# Patient Record
Sex: MALE | Race: WHITE | HISPANIC OR LATINO | Employment: PART TIME | URBAN - METROPOLITAN AREA
[De-identification: names, ages, dates, MRNs, and addresses within clinical notes are randomized per-mention and may not be internally consistent; named-entity substitution may affect disease eponyms.]

---

## 2017-01-02 ENCOUNTER — HOSPITAL ENCOUNTER (OUTPATIENT)
Dept: GENERAL RADIOLOGY | Facility: HOSPITAL | Age: 27
Discharge: HOME | End: 2017-01-02
Admitting: FAMILY MEDICINE

## 2017-01-02 DIAGNOSIS — R76.11 PPD POSITIVE: ICD-10-CM

## 2017-01-02 PROCEDURE — 71020 HC CHEST PA AND LATERAL: CPT

## 2024-09-17 ENCOUNTER — OFFICE VISIT (OUTPATIENT)
Dept: FAMILY MEDICINE CLINIC | Facility: CLINIC | Age: 34
End: 2024-09-17
Payer: COMMERCIAL

## 2024-09-17 VITALS
DIASTOLIC BLOOD PRESSURE: 72 MMHG | HEART RATE: 78 BPM | OXYGEN SATURATION: 98 % | WEIGHT: 20 LBS | TEMPERATURE: 98.6 F | SYSTOLIC BLOOD PRESSURE: 106 MMHG

## 2024-09-17 DIAGNOSIS — R19.7 DIARRHEA, UNSPECIFIED TYPE: ICD-10-CM

## 2024-09-17 DIAGNOSIS — R10.32 LEFT LOWER QUADRANT PAIN: Primary | ICD-10-CM

## 2024-09-17 PROCEDURE — 99204 OFFICE O/P NEW MOD 45 MIN: CPT | Performed by: NURSE PRACTITIONER

## 2024-09-18 LAB
ALBUMIN SERPL-MCNC: 4.5 G/DL (ref 4.1–5.1)
ALP SERPL-CCNC: 111 IU/L (ref 44–121)
ALT SERPL-CCNC: 46 IU/L (ref 0–44)
AMYLASE SERPL-CCNC: 44 U/L (ref 31–110)
AST SERPL-CCNC: 20 IU/L (ref 0–40)
BASOPHILS # BLD AUTO: 0.1 X10E3/UL (ref 0–0.2)
BASOPHILS NFR BLD AUTO: 1 %
BILIRUB SERPL-MCNC: 0.2 MG/DL (ref 0–1.2)
BUN SERPL-MCNC: 12 MG/DL (ref 6–20)
BUN/CREAT SERPL: 15 (ref 9–20)
CALCIUM SERPL-MCNC: 9.8 MG/DL (ref 8.7–10.2)
CHLORIDE SERPL-SCNC: 97 MMOL/L (ref 96–106)
CHOLEST SERPL-MCNC: 182 MG/DL (ref 100–199)
CO2 SERPL-SCNC: 22 MMOL/L (ref 20–29)
CREAT SERPL-MCNC: 0.79 MG/DL (ref 0.76–1.27)
EGFRCR SERPLBLD CKD-EPI 2021: 118 ML/MIN/1.73
EOSINOPHIL # BLD AUTO: 0.3 X10E3/UL (ref 0–0.4)
EOSINOPHIL NFR BLD AUTO: 2 %
ERYTHROCYTE [DISTWIDTH] IN BLOOD BY AUTOMATED COUNT: 12.1 % (ref 11.6–15.4)
GLOBULIN SER CALC-MCNC: 3.2 G/DL (ref 1.5–4.5)
GLUCOSE SERPL-MCNC: 60 MG/DL (ref 70–99)
HCT VFR BLD AUTO: 44 % (ref 37.5–51)
HDLC SERPL-MCNC: 29 MG/DL
HGB BLD-MCNC: 14.4 G/DL (ref 13–17.7)
IMM GRANULOCYTES # BLD AUTO: 0.1 X10E3/UL (ref 0–0.1)
IMM GRANULOCYTES NFR BLD AUTO: 1 %
LDLC SERPL CALC-MCNC: 123 MG/DL (ref 0–99)
LIPASE SERPL-CCNC: 23 U/L (ref 13–78)
LYMPHOCYTES # BLD AUTO: 2.9 X10E3/UL (ref 0.7–3.1)
LYMPHOCYTES NFR BLD AUTO: 28 %
MCH RBC QN AUTO: 29 PG (ref 26.6–33)
MCHC RBC AUTO-ENTMCNC: 32.7 G/DL (ref 31.5–35.7)
MCV RBC AUTO: 89 FL (ref 79–97)
MONOCYTES # BLD AUTO: 1 X10E3/UL (ref 0.1–0.9)
MONOCYTES NFR BLD AUTO: 9 %
NEUTROPHILS # BLD AUTO: 6.3 X10E3/UL (ref 1.4–7)
NEUTROPHILS NFR BLD AUTO: 59 %
PLATELET # BLD AUTO: 574 X10E3/UL (ref 150–450)
POTASSIUM SERPL-SCNC: 4.9 MMOL/L (ref 3.5–5.2)
PROT SERPL-MCNC: 7.7 G/DL (ref 6–8.5)
RBC # BLD AUTO: 4.96 X10E6/UL (ref 4.14–5.8)
SODIUM SERPL-SCNC: 138 MMOL/L (ref 134–144)
T4 FREE SERPL-MCNC: 1.12 NG/DL (ref 0.82–1.77)
TRIGL SERPL-MCNC: 165 MG/DL (ref 0–149)
TSH SERPL DL<=0.005 MIU/L-ACNC: 1.26 UIU/ML (ref 0.45–4.5)
VLDLC SERPL CALC-MCNC: 30 MG/DL (ref 5–40)
WBC # BLD AUTO: 10.6 X10E3/UL (ref 3.4–10.8)

## 2024-09-19 DIAGNOSIS — R19.7 DIARRHEA, UNSPECIFIED TYPE: Primary | ICD-10-CM

## 2024-09-19 DIAGNOSIS — R10.32 LEFT LOWER QUADRANT PAIN: ICD-10-CM

## 2024-09-19 DIAGNOSIS — R79.89 ELEVATED PLATELET COUNT: Primary | ICD-10-CM

## 2024-09-20 ENCOUNTER — PATIENT ROUNDING (BHMG ONLY) (OUTPATIENT)
Dept: FAMILY MEDICINE CLINIC | Facility: CLINIC | Age: 34
End: 2024-09-20
Payer: COMMERCIAL

## 2024-09-24 ENCOUNTER — HOSPITAL ENCOUNTER (OUTPATIENT)
Dept: CT IMAGING | Facility: HOSPITAL | Age: 34
Discharge: HOME | End: 2024-09-24
Admitting: NURSE PRACTITIONER
Payer: COMMERCIAL

## 2024-09-24 DIAGNOSIS — R10.32 LEFT LOWER QUADRANT PAIN: ICD-10-CM

## 2024-09-24 DIAGNOSIS — R19.7 DIARRHEA, UNSPECIFIED TYPE: ICD-10-CM

## 2024-09-24 PROCEDURE — 74177 CT ABD & PELVIS W/CONTRAST: CPT

## 2024-09-24 PROCEDURE — 25510000001 IOPAMIDOL 61 % SOLUTION: Performed by: NURSE PRACTITIONER

## 2024-09-24 PROCEDURE — 0 DIATRIZOATE MEGLUMINE & SODIUM PER 1 ML: Performed by: NURSE PRACTITIONER

## 2024-09-24 RX ORDER — IOPAMIDOL 612 MG/ML
100 INJECTION, SOLUTION INTRAVASCULAR
Status: COMPLETED | OUTPATIENT
Start: 2024-09-24 | End: 2024-09-24

## 2024-09-24 RX ORDER — DIATRIZOATE MEGLUMINE AND DIATRIZOATE SODIUM 660; 100 MG/ML; MG/ML
30 SOLUTION ORAL; RECTAL
Status: COMPLETED | OUTPATIENT
Start: 2024-09-24 | End: 2024-09-24

## 2024-09-24 RX ADMIN — IOPAMIDOL 85 ML: 612 INJECTION, SOLUTION INTRAVENOUS at 17:18

## 2024-09-24 RX ADMIN — DIATRIZOATE MEGLUMINE AND DIATRIZOATE SODIUM 30 ML: 660; 100 LIQUID ORAL; RECTAL at 17:18

## 2024-09-26 DIAGNOSIS — K38.9 DISORDER OF APPENDIX: ICD-10-CM

## 2024-09-26 DIAGNOSIS — R93.5 ABNORMAL ABDOMINAL CT SCAN: Primary | ICD-10-CM

## 2024-10-03 ENCOUNTER — OFFICE VISIT (OUTPATIENT)
Dept: SURGERY | Facility: CLINIC | Age: 34
End: 2024-10-03
Payer: COMMERCIAL

## 2024-10-03 ENCOUNTER — HOSPITAL ENCOUNTER (OUTPATIENT)
Dept: CT IMAGING | Facility: HOSPITAL | Age: 34
Discharge: HOME | End: 2024-10-03
Admitting: SURGERY
Payer: COMMERCIAL

## 2024-10-03 ENCOUNTER — PREP FOR SURGERY (OUTPATIENT)
Dept: OTHER | Facility: HOSPITAL | Age: 34
End: 2024-10-03
Payer: COMMERCIAL

## 2024-10-03 VITALS
WEIGHT: 205.6 LBS | SYSTOLIC BLOOD PRESSURE: 128 MMHG | HEIGHT: 76 IN | DIASTOLIC BLOOD PRESSURE: 90 MMHG | BODY MASS INDEX: 25.04 KG/M2

## 2024-10-03 DIAGNOSIS — R10.31 RLQ ABDOMINAL PAIN: ICD-10-CM

## 2024-10-03 DIAGNOSIS — R93.5 ABNORMAL CT OF THE ABDOMEN: Primary | ICD-10-CM

## 2024-10-03 DIAGNOSIS — R93.5 ABNORMAL CT OF THE ABDOMEN: ICD-10-CM

## 2024-10-03 DIAGNOSIS — K38.9 DISORDER OF APPENDIX: ICD-10-CM

## 2024-10-03 LAB — CREAT BLDA-MCNC: 1 MG/DL (ref 0.6–1.3)

## 2024-10-03 PROCEDURE — 99204 OFFICE O/P NEW MOD 45 MIN: CPT | Performed by: PHYSICIAN ASSISTANT

## 2024-10-03 PROCEDURE — 74177 CT ABD & PELVIS W/CONTRAST: CPT

## 2024-10-03 PROCEDURE — 25510000001 IOPAMIDOL 61 % SOLUTION: Performed by: SURGERY

## 2024-10-03 PROCEDURE — 82565 ASSAY OF CREATININE: CPT

## 2024-10-03 RX ORDER — AMOXICILLIN 500 MG/1
500 CAPSULE ORAL 3 TIMES DAILY
COMMUNITY
End: 2024-10-04 | Stop reason: HOSPADM

## 2024-10-03 RX ORDER — IOPAMIDOL 612 MG/ML
100 INJECTION, SOLUTION INTRAVASCULAR
Status: COMPLETED | OUTPATIENT
Start: 2024-10-03 | End: 2024-10-03

## 2024-10-03 RX ADMIN — IOPAMIDOL 85 ML: 612 INJECTION, SOLUTION INTRAVENOUS at 11:55

## 2024-10-03 NOTE — PROGRESS NOTES
ASSESSMENT/PLAN:    34-year-old gentleman with an abnormal CT concerning for acute appendicitis but with near resolution of his right lower quadrant abdominal pain since being on antibiotics.  Given the extent of the inflammation on his previous CT we repeated his CT as stat and this again demonstrated findings most consistent with acute appendicitis.  As such Dr. Wilhelm and I have recommended proceeding with a laparoscopic appendectomy.  Risks and rationale were discussed with him with risks including but not limited to bleeding, infection, converting to an open procedure and the expected postoperative course.  All questions were answered and he was willing to proceed with all recommendations.    CC:     Abnormal CT    HPI:    34-year-old gentleman presenting to the office today as a self-referral.  Over the last 3 weeks he has complained of lower abdominal pain that has been intermittent but culminated in him being awoken at night having significant right lower quadrant abdominal pain as well as significant sweating concerning for a fever.  He denies having had nausea or vomiting during this nor has he had abdominal pain elsewhere, changes to his bowel function or any additional complaints.  He was set up with a primary care doctor and was placed on Augmentin which improved his symptoms.  He was also sent in for a stat CT scan which demonstrated right lower quadrant ill-defined tissue attenuation nodular density located along the course of the appendix and adjacent terminal ileum.  With this finding he was recommended to come to our office to discuss options.    ENDOSCOPY:   Colonoscopy: None    RADIOLOGY:   CT abdomen pelvis 9/24/2024: Right lower quadrant ill-defined soft tissue attenuation nodular density located along the course of the appendix and adjacent terminal ileum  CT abdomen pelvis 10/3/2024: Right lower quadrant inflammation extending caudal to the cecum and anterior to the distal right psoas muscle  "appears centered around the appendix with enlargement of the appendiceal tip    SOCIAL HISTORY:   Every day tobacco use  Occasional alcohol use    FAMILY HISTORY:    Colorectal cancer: None    PREVIOUS ABDOMINAL SURGERY    None    OTHER SURGERY  History reviewed. No pertinent surgical history.    PAST MEDICAL HISTORY:    History reviewed. No pertinent past medical history.    MEDICATIONS:     Current Outpatient Medications:     amoxicillin (AMOXIL) 500 MG capsule, Take 1 capsule by mouth 3 (Three) Times a Day., Disp: , Rfl:     meloxicam (MOBIC) 15 MG tablet, Take 1 tablet by mouth Daily., Disp: 30 tablet, Rfl: 5  No current facility-administered medications for this visit.    ALLERGIES:   No Known Allergies    PHYSICAL EXAM:   Constitutional: Well-developed well-nourished, no acute distress  Vital signs:   Height 76\"  Weight 215  BMI 25  Neck: Supple, no palpable mass, trachea midline  Respiratory: Clear to auscultation, normal inspiratory effort  Cardiovascular: Regular rate, no murmur, no carotid bruit  Gastrointestinal: Nontender right lower quadrant, remainder of abdomen is soft, nontender  Psychiatric: Alert and oriented ×3, normal affect          Kilo Torres PA-C    [facility] - General Surgery  [address], [address]  [place] [address]    [address] [facility] [address] [facility], [address]  [place] [address]    Office: [phone]  Fax: [phone]     "

## 2024-10-03 NOTE — NURSING NOTE
1200 Patient arrived in radiology triage for stat hold and call.    1242 Dr. Wilhelm called patient and told them they could leave. I removed IV and directed them to Entrance A to exit.

## 2024-10-03 NOTE — H&P (VIEW-ONLY)
ASSESSMENT/PLAN:    34-year-old gentleman with an abnormal CT concerning for acute appendicitis but with near resolution of his right lower quadrant abdominal pain since being on antibiotics.  Given the extent of the inflammation on his previous CT we repeated his CT as stat and this again demonstrated findings most consistent with acute appendicitis.  As such Dr. Wilhelm and I have recommended proceeding with a laparoscopic appendectomy.  Risks and rationale were discussed with him with risks including but not limited to bleeding, infection, converting to an open procedure and the expected postoperative course.  All questions were answered and he was willing to proceed with all recommendations.    CC:     Abnormal CT    HPI:    34-year-old gentleman presenting to the office today as a self-referral.  Over the last 3 weeks he has complained of lower abdominal pain that has been intermittent but culminated in him being awoken at night having significant right lower quadrant abdominal pain as well as significant sweating concerning for a fever.  He denies having had nausea or vomiting during this nor has he had abdominal pain elsewhere, changes to his bowel function or any additional complaints.  He was set up with a primary care doctor and was placed on Augmentin which improved his symptoms.  He was also sent in for a stat CT scan which demonstrated right lower quadrant ill-defined tissue attenuation nodular density located along the course of the appendix and adjacent terminal ileum.  With this finding he was recommended to come to our office to discuss options.    ENDOSCOPY:   Colonoscopy: None    RADIOLOGY:   CT abdomen pelvis 9/24/2024: Right lower quadrant ill-defined soft tissue attenuation nodular density located along the course of the appendix and adjacent terminal ileum  CT abdomen pelvis 10/3/2024: Right lower quadrant inflammation extending caudal to the cecum and anterior to the distal right psoas muscle  "appears centered around the appendix with enlargement of the appendiceal tip    SOCIAL HISTORY:   Every day tobacco use  Occasional alcohol use    FAMILY HISTORY:    Colorectal cancer: None    PREVIOUS ABDOMINAL SURGERY    None    OTHER SURGERY  History reviewed. No pertinent surgical history.    PAST MEDICAL HISTORY:    History reviewed. No pertinent past medical history.    MEDICATIONS:     Current Outpatient Medications:     amoxicillin (AMOXIL) 500 MG capsule, Take 1 capsule by mouth 3 (Three) Times a Day., Disp: , Rfl:     meloxicam (MOBIC) 15 MG tablet, Take 1 tablet by mouth Daily., Disp: 30 tablet, Rfl: 5  No current facility-administered medications for this visit.    ALLERGIES:   No Known Allergies    PHYSICAL EXAM:   Constitutional: Well-developed well-nourished, no acute distress  Vital signs:   Height 76\"  Weight 215  BMI 25  Neck: Supple, no palpable mass, trachea midline  Respiratory: Clear to auscultation, normal inspiratory effort  Cardiovascular: Regular rate, no murmur, no carotid bruit  Gastrointestinal: Nontender right lower quadrant, remainder of abdomen is soft, nontender  Psychiatric: Alert and oriented ×3, normal affect          Kilo Torres PA-C    [facility] - General Surgery  [address], [address]  [place] [address]    [address] [facility] [facility], [address]  [place] [address]    Office: [phone]  Fax: [phone]     "

## 2024-10-04 ENCOUNTER — ANESTHESIA (OUTPATIENT)
Dept: PERIOP | Facility: HOSPITAL | Age: 34
End: 2024-10-04
Payer: COMMERCIAL

## 2024-10-04 ENCOUNTER — ANESTHESIA EVENT (OUTPATIENT)
Dept: PERIOP | Facility: HOSPITAL | Age: 34
End: 2024-10-04
Payer: COMMERCIAL

## 2024-10-04 ENCOUNTER — HOSPITAL ENCOUNTER (OUTPATIENT)
Facility: HOSPITAL | Age: 34
Setting detail: HOSPITAL OUTPATIENT SURGERY
Discharge: HOME | End: 2024-10-04
Attending: SURGERY | Admitting: SURGERY
Payer: COMMERCIAL

## 2024-10-04 VITALS
RESPIRATION RATE: 14 BRPM | DIASTOLIC BLOOD PRESSURE: 93 MMHG | HEART RATE: 60 BPM | TEMPERATURE: 97.5 F | SYSTOLIC BLOOD PRESSURE: 135 MMHG | OXYGEN SATURATION: 99 %

## 2024-10-04 DIAGNOSIS — R10.31 RLQ ABDOMINAL PAIN: ICD-10-CM

## 2024-10-04 DIAGNOSIS — K38.9 DISORDER OF APPENDIX: ICD-10-CM

## 2024-10-04 DIAGNOSIS — R93.5 ABNORMAL CT OF THE ABDOMEN: ICD-10-CM

## 2024-10-04 PROCEDURE — 25010000002 SUGAMMADEX 200 MG/2ML SOLUTION: Performed by: ANESTHESIOLOGY

## 2024-10-04 PROCEDURE — 25810000003 LACTATED RINGERS PER 1000 ML: Performed by: ANESTHESIOLOGY

## 2024-10-04 PROCEDURE — 25810000003 SODIUM CHLORIDE PER 500 ML: Performed by: SURGERY

## 2024-10-04 PROCEDURE — 44970 LAPAROSCOPY APPENDECTOMY: CPT | Performed by: SURGERY

## 2024-10-04 PROCEDURE — 88304 TISSUE EXAM BY PATHOLOGIST: CPT | Performed by: SURGERY

## 2024-10-04 PROCEDURE — 44970 LAPAROSCOPY APPENDECTOMY: CPT | Performed by: PHYSICIAN ASSISTANT

## 2024-10-04 PROCEDURE — 25010000002 ONDANSETRON PER 1 MG: Performed by: ANESTHESIOLOGY

## 2024-10-04 PROCEDURE — 25010000002 HYDROMORPHONE 1 MG/ML SOLUTION: Performed by: ANESTHESIOLOGY

## 2024-10-04 PROCEDURE — 25010000002 CEFAZOLIN PER 500 MG: Performed by: SURGERY

## 2024-10-04 PROCEDURE — 25010000002 MIDAZOLAM PER 1 MG: Performed by: ANESTHESIOLOGY

## 2024-10-04 PROCEDURE — 25010000002 PROPOFOL 10 MG/ML EMULSION: Performed by: ANESTHESIOLOGY

## 2024-10-04 PROCEDURE — 25010000002 LIDOCAINE 2% SOLUTION: Performed by: ANESTHESIOLOGY

## 2024-10-04 PROCEDURE — 25010000002 DEXAMETHASONE PER 1 MG: Performed by: ANESTHESIOLOGY

## 2024-10-04 PROCEDURE — 88312 SPECIAL STAINS GROUP 1: CPT | Performed by: SURGERY

## 2024-10-04 PROCEDURE — 25010000002 KETOROLAC TROMETHAMINE PER 15 MG: Performed by: ANESTHESIOLOGY

## 2024-10-04 DEVICE — HORIZON TI ML 6 CLIPS/CART
Type: IMPLANTABLE DEVICE | Site: ABDOMEN | Status: FUNCTIONAL
Brand: WECK

## 2024-10-04 DEVICE — THE ECHELON, ECHELON ENDOPATH™ AND ECHELON FLEX™ FAMILIES OF ENDOSCOPIC LINEAR CUTTERS AND RELOADS ARE STERILE, SINGLE PATIENT USE INSTRUMENTS THAT SIMULTANEOUSLY CUT AND STAPLE TISSUE. THERE ARE SIX STAGGERED ROWS OF STAPLES, THREE ON EITHER SIDE OF THE CUT LINE. THE 45 MM INSTRUMENTS HAVE A STAPLE LINE THATIS APPROXIMATELY 45 MM LONG AND A CUT LINE THAT IS APPROXIMATELY 42 MM LONG. THE SHAFT CAN ROTATE FREELY IN BOTH DIRECTIONS AND AN ARTICULATION MECHANISM ON ARTICULATING INSTRUMENTS ENABLES BENDING THE DISTAL PORTIONOF THE SHAFT TO FACILITATE LATERAL ACCESS OF THE OPERATIVE SITE.THE INSTRUMENTS ARE SHIPPED WITHOUT A RELOAD AND MUST BE LOADED PRIOR TO USE. A STAPLE RETAINING CAP ON THE RELOAD PROTECTS THE STAPLE LEG POINTS DURING SHIPPING AND TRANSPORTATION. THE INSTRUMENTS’ LOCK-OUT FEATURE IS DESIGNED TO PREVENT A USED RELOAD FROM BEING REFIRED.
Type: IMPLANTABLE DEVICE | Site: CECUM | Status: FUNCTIONAL
Brand: ECHELON ENDOPATH

## 2024-10-04 RX ORDER — FENTANYL CITRATE 50 UG/ML
50 INJECTION, SOLUTION INTRAMUSCULAR; INTRAVENOUS
Status: DISCONTINUED | OUTPATIENT
Start: 2024-10-04 | End: 2024-10-04 | Stop reason: HOSPADM

## 2024-10-04 RX ORDER — EPHEDRINE SULFATE 50 MG/ML
5 INJECTION, SOLUTION INTRAVENOUS ONCE AS NEEDED
Status: DISCONTINUED | OUTPATIENT
Start: 2024-10-04 | End: 2024-10-04 | Stop reason: HOSPADM

## 2024-10-04 RX ORDER — DROPERIDOL 2.5 MG/ML
0.62 INJECTION, SOLUTION INTRAMUSCULAR; INTRAVENOUS
Status: DISCONTINUED | OUTPATIENT
Start: 2024-10-04 | End: 2024-10-04 | Stop reason: HOSPADM

## 2024-10-04 RX ORDER — SODIUM CHLORIDE, SODIUM LACTATE, POTASSIUM CHLORIDE, CALCIUM CHLORIDE 600; 310; 30; 20 MG/100ML; MG/100ML; MG/100ML; MG/100ML
9 INJECTION, SOLUTION INTRAVENOUS CONTINUOUS
Status: DISCONTINUED | OUTPATIENT
Start: 2024-10-04 | End: 2024-10-04 | Stop reason: HOSPADM

## 2024-10-04 RX ORDER — DEXAMETHASONE SODIUM PHOSPHATE 4 MG/ML
INJECTION, SOLUTION INTRA-ARTICULAR; INTRALESIONAL; INTRAMUSCULAR; INTRAVENOUS; SOFT TISSUE AS NEEDED
Status: DISCONTINUED | OUTPATIENT
Start: 2024-10-04 | End: 2024-10-04 | Stop reason: SURG

## 2024-10-04 RX ORDER — OXYCODONE AND ACETAMINOPHEN 7.5; 325 MG/1; MG/1
1 TABLET ORAL EVERY 4 HOURS PRN
Status: DISCONTINUED | OUTPATIENT
Start: 2024-10-04 | End: 2024-10-04 | Stop reason: HOSPADM

## 2024-10-04 RX ORDER — ONDANSETRON 2 MG/ML
INJECTION INTRAMUSCULAR; INTRAVENOUS AS NEEDED
Status: DISCONTINUED | OUTPATIENT
Start: 2024-10-04 | End: 2024-10-04 | Stop reason: SURG

## 2024-10-04 RX ORDER — DIPHENHYDRAMINE HYDROCHLORIDE 50 MG/ML
12.5 INJECTION INTRAMUSCULAR; INTRAVENOUS
Status: DISCONTINUED | OUTPATIENT
Start: 2024-10-04 | End: 2024-10-04 | Stop reason: HOSPADM

## 2024-10-04 RX ORDER — NALOXONE HCL 0.4 MG/ML
0.2 VIAL (ML) INJECTION AS NEEDED
Status: DISCONTINUED | OUTPATIENT
Start: 2024-10-04 | End: 2024-10-04 | Stop reason: HOSPADM

## 2024-10-04 RX ORDER — PROMETHAZINE HYDROCHLORIDE 25 MG/1
25 TABLET ORAL ONCE AS NEEDED
Status: DISCONTINUED | OUTPATIENT
Start: 2024-10-04 | End: 2024-10-04 | Stop reason: HOSPADM

## 2024-10-04 RX ORDER — LIDOCAINE HYDROCHLORIDE 20 MG/ML
INJECTION, SOLUTION INFILTRATION; PERINEURAL AS NEEDED
Status: DISCONTINUED | OUTPATIENT
Start: 2024-10-04 | End: 2024-10-04 | Stop reason: SURG

## 2024-10-04 RX ORDER — IPRATROPIUM BROMIDE AND ALBUTEROL SULFATE 2.5; .5 MG/3ML; MG/3ML
3 SOLUTION RESPIRATORY (INHALATION) ONCE AS NEEDED
Status: DISCONTINUED | OUTPATIENT
Start: 2024-10-04 | End: 2024-10-04 | Stop reason: HOSPADM

## 2024-10-04 RX ORDER — ROCURONIUM BROMIDE 10 MG/ML
INJECTION, SOLUTION INTRAVENOUS AS NEEDED
Status: DISCONTINUED | OUTPATIENT
Start: 2024-10-04 | End: 2024-10-04 | Stop reason: SURG

## 2024-10-04 RX ORDER — SODIUM CHLORIDE 0.9 % (FLUSH) 0.9 %
3 SYRINGE (ML) INJECTION EVERY 12 HOURS SCHEDULED
Status: DISCONTINUED | OUTPATIENT
Start: 2024-10-04 | End: 2024-10-04 | Stop reason: HOSPADM

## 2024-10-04 RX ORDER — FLUMAZENIL 0.1 MG/ML
0.2 INJECTION INTRAVENOUS AS NEEDED
Status: DISCONTINUED | OUTPATIENT
Start: 2024-10-04 | End: 2024-10-04 | Stop reason: HOSPADM

## 2024-10-04 RX ORDER — ONDANSETRON 2 MG/ML
4 INJECTION INTRAMUSCULAR; INTRAVENOUS ONCE AS NEEDED
Status: DISCONTINUED | OUTPATIENT
Start: 2024-10-04 | End: 2024-10-04 | Stop reason: HOSPADM

## 2024-10-04 RX ORDER — PROMETHAZINE HYDROCHLORIDE 25 MG/1
25 SUPPOSITORY RECTAL ONCE AS NEEDED
Status: DISCONTINUED | OUTPATIENT
Start: 2024-10-04 | End: 2024-10-04 | Stop reason: HOSPADM

## 2024-10-04 RX ORDER — SODIUM CHLORIDE, SODIUM LACTATE, POTASSIUM CHLORIDE, CALCIUM CHLORIDE 600; 310; 30; 20 MG/100ML; MG/100ML; MG/100ML; MG/100ML
INJECTION, SOLUTION INTRAVENOUS CONTINUOUS PRN
Status: DISCONTINUED | OUTPATIENT
Start: 2024-10-04 | End: 2024-10-04 | Stop reason: SURG

## 2024-10-04 RX ORDER — HYDROMORPHONE HYDROCHLORIDE 1 MG/ML
0.5 INJECTION, SOLUTION INTRAMUSCULAR; INTRAVENOUS; SUBCUTANEOUS
Status: DISCONTINUED | OUTPATIENT
Start: 2024-10-04 | End: 2024-10-04 | Stop reason: HOSPADM

## 2024-10-04 RX ORDER — MIDAZOLAM HYDROCHLORIDE 1 MG/ML
1 INJECTION INTRAMUSCULAR; INTRAVENOUS
Status: DISCONTINUED | OUTPATIENT
Start: 2024-10-04 | End: 2024-10-04 | Stop reason: HOSPADM

## 2024-10-04 RX ORDER — KETOROLAC TROMETHAMINE 30 MG/ML
INJECTION, SOLUTION INTRAMUSCULAR; INTRAVENOUS AS NEEDED
Status: DISCONTINUED | OUTPATIENT
Start: 2024-10-04 | End: 2024-10-04 | Stop reason: SURG

## 2024-10-04 RX ORDER — SODIUM CHLORIDE 9 MG/ML
INJECTION, SOLUTION INTRAVENOUS AS NEEDED
Status: DISCONTINUED | OUTPATIENT
Start: 2024-10-04 | End: 2024-10-04 | Stop reason: HOSPADM

## 2024-10-04 RX ORDER — PROPOFOL 10 MG/ML
VIAL (ML) INTRAVENOUS AS NEEDED
Status: DISCONTINUED | OUTPATIENT
Start: 2024-10-04 | End: 2024-10-04 | Stop reason: SURG

## 2024-10-04 RX ORDER — ACETAMINOPHEN 500 MG
1000 TABLET ORAL ONCE
Status: COMPLETED | OUTPATIENT
Start: 2024-10-04 | End: 2024-10-04

## 2024-10-04 RX ORDER — LIDOCAINE HYDROCHLORIDE 10 MG/ML
0.5 INJECTION, SOLUTION INFILTRATION; PERINEURAL ONCE AS NEEDED
Status: DISCONTINUED | OUTPATIENT
Start: 2024-10-04 | End: 2024-10-04 | Stop reason: HOSPADM

## 2024-10-04 RX ORDER — ONDANSETRON 4 MG/1
4 TABLET, FILM COATED ORAL EVERY 6 HOURS PRN
Qty: 10 TABLET | Refills: 0 | Status: SHIPPED | OUTPATIENT
Start: 2024-10-04

## 2024-10-04 RX ORDER — BUPIVACAINE HYDROCHLORIDE AND EPINEPHRINE 5; 5 MG/ML; UG/ML
INJECTION, SOLUTION EPIDURAL; INTRACAUDAL; PERINEURAL AS NEEDED
Status: DISCONTINUED | OUTPATIENT
Start: 2024-10-04 | End: 2024-10-04 | Stop reason: HOSPADM

## 2024-10-04 RX ORDER — HYDROCODONE BITARTRATE AND ACETAMINOPHEN 5; 325 MG/1; MG/1
1 TABLET ORAL ONCE AS NEEDED
Status: DISCONTINUED | OUTPATIENT
Start: 2024-10-04 | End: 2024-10-04 | Stop reason: HOSPADM

## 2024-10-04 RX ORDER — SODIUM CHLORIDE 0.9 % (FLUSH) 0.9 %
3-10 SYRINGE (ML) INJECTION AS NEEDED
Status: DISCONTINUED | OUTPATIENT
Start: 2024-10-04 | End: 2024-10-04 | Stop reason: HOSPADM

## 2024-10-04 RX ORDER — LABETALOL HYDROCHLORIDE 5 MG/ML
5 INJECTION, SOLUTION INTRAVENOUS
Status: DISCONTINUED | OUTPATIENT
Start: 2024-10-04 | End: 2024-10-04 | Stop reason: HOSPADM

## 2024-10-04 RX ORDER — HYDROCODONE BITARTRATE AND ACETAMINOPHEN 5; 325 MG/1; MG/1
1 TABLET ORAL EVERY 4 HOURS PRN
Qty: 10 TABLET | Refills: 0 | Status: SHIPPED | OUTPATIENT
Start: 2024-10-04

## 2024-10-04 RX ORDER — SCOLOPAMINE TRANSDERMAL SYSTEM 1 MG/1
1 PATCH, EXTENDED RELEASE TRANSDERMAL ONCE
Status: DISCONTINUED | OUTPATIENT
Start: 2024-10-04 | End: 2024-10-04 | Stop reason: HOSPADM

## 2024-10-04 RX ORDER — HYDRALAZINE HYDROCHLORIDE 20 MG/ML
5 INJECTION INTRAMUSCULAR; INTRAVENOUS
Status: DISCONTINUED | OUTPATIENT
Start: 2024-10-04 | End: 2024-10-04 | Stop reason: HOSPADM

## 2024-10-04 RX ADMIN — SODIUM CHLORIDE, POTASSIUM CHLORIDE, SODIUM LACTATE AND CALCIUM CHLORIDE: 600; 310; 30; 20 INJECTION, SOLUTION INTRAVENOUS at 16:04

## 2024-10-04 RX ADMIN — HYDROMORPHONE HYDROCHLORIDE 0.5 MG: 1 INJECTION, SOLUTION INTRAMUSCULAR; INTRAVENOUS; SUBCUTANEOUS at 16:12

## 2024-10-04 RX ADMIN — SCOPALAMINE 1 PATCH: 1 PATCH, EXTENDED RELEASE TRANSDERMAL at 14:15

## 2024-10-04 RX ADMIN — SUGAMMADEX 200 MG: 100 INJECTION, SOLUTION INTRAVENOUS at 16:57

## 2024-10-04 RX ADMIN — ONDANSETRON 4 MG: 2 INJECTION INTRAMUSCULAR; INTRAVENOUS at 16:46

## 2024-10-04 RX ADMIN — DEXAMETHASONE SODIUM PHOSPHATE 8 MG: 4 INJECTION, SOLUTION INTRA-ARTICULAR; INTRALESIONAL; INTRAMUSCULAR; INTRAVENOUS; SOFT TISSUE at 16:12

## 2024-10-04 RX ADMIN — HYDROMORPHONE HYDROCHLORIDE 0.5 MG: 1 INJECTION, SOLUTION INTRAMUSCULAR; INTRAVENOUS; SUBCUTANEOUS at 16:17

## 2024-10-04 RX ADMIN — MIDAZOLAM 1 MG: 1 INJECTION INTRAMUSCULAR; INTRAVENOUS at 14:15

## 2024-10-04 RX ADMIN — ROCURONIUM BROMIDE 50 MG: 10 INJECTION, SOLUTION INTRAVENOUS at 16:08

## 2024-10-04 RX ADMIN — SODIUM CHLORIDE, POTASSIUM CHLORIDE, SODIUM LACTATE AND CALCIUM CHLORIDE 9 ML/HR: 600; 310; 30; 20 INJECTION, SOLUTION INTRAVENOUS at 14:00

## 2024-10-04 RX ADMIN — Medication 3 ML: at 15:40

## 2024-10-04 RX ADMIN — ACETAMINOPHEN 1000 MG: 500 TABLET ORAL at 14:15

## 2024-10-04 RX ADMIN — PROPOFOL 250 MG: 10 INJECTION, EMULSION INTRAVENOUS at 16:08

## 2024-10-04 RX ADMIN — LIDOCAINE HYDROCHLORIDE 100 MG: 20 INJECTION, SOLUTION INFILTRATION; PERINEURAL at 16:08

## 2024-10-04 RX ADMIN — SODIUM CHLORIDE 2000 MG: 900 INJECTION INTRAVENOUS at 15:59

## 2024-10-04 RX ADMIN — KETOROLAC TROMETHAMINE 30 MG: 30 INJECTION, SOLUTION INTRAMUSCULAR at 16:46

## 2024-10-04 NOTE — OP NOTE
PREOPERATIVE DIAGNOSIS:  Appendicitis    POSTOPERATIVE DIAGNOSIS (FINDINGS):  Same without evidence of perforation    PROCEDURE:  Laparoscopic appendectomy    SURGEON:  Solitario Wilhelm MD    ASSISTANT:  Kilo Torres was responsible for performing the following activities: suction, irrigation, suturing, closing, retraction, camera holding, and placing dressing, and their skilled assistance was necessary for the success of this case.    ANESTHESIA:  General    EBL:  Minimal    SPECIMEN(S):  Appendix    DESCRIPTION:  In supine position under general anesthetic prepped and draped usual sterile manner.  Half percent Marcaine with epinephrine infiltrated locally.  Small transverse incision made to the left of the umbilicus and Veress needle inserted with upward traction on the abdominal wall.  Abdomen insufflated 15 mmHg and a 12 mm Optiview trocar inserted followed by suprapubic 5 and right lower quadrant 5 mm trocar.  The appendix was walled off by the terminal ileum and omentum.  The terminal ileum and omentum were bluntly dissected off of the appendix which was inflamed in an acute and chronic sense.  There was no evidence of perforation.  The appendix was dissected sequentially dissecting the appendiceal mesentery which was clipped and divided.  The base of the appendix was transected flush with the cecum using the [place] white loaded stapler.  The appendix was placed in an Endo Catch bag and removed through the umbilical trocar site.  The right lower quadrant was irrigated and aspirated and good hemostasis was noted.  CO2 was released.  Fascia of the umbilical trocar site closed in posterior and anterior layer of 0 Vicryl.  Skin edges closed with 5-0 Vicryl subcuticular followed by Exofin.  Tolerated well, stable to recovery room.    Solitario Wilhelm M.D.

## 2024-10-04 NOTE — ANESTHESIA POSTPROCEDURE EVALUATION
Patient: Anu Dogic    Procedure Summary       Date: 10/04/24 Room / Location:  [facility]  /  [facility]    Anesthesia Start: 1604 Anesthesia Stop: 1706    Procedure: APPENDECTOMY LAPAROSCOPIC (Abdomen) Diagnosis:       Abnormal CT of the abdomen      RLQ abdominal pain      Disorder of appendix      (Abnormal CT of the abdomen [R93.5])      (RLQ abdominal pain [R10.31])      (Disorder of appendix [K38.9])    Surgeons: Solitario Wilhelm MD Provider: Sivakumar Doan MD    Anesthesia Type: general ASA Status: 2            Anesthesia Type: general    Vitals  Vitals Value Taken Time   /102 10/04/24 1730   Temp 36.4 °C (97.5 °F) 10/04/24 1705   Pulse 57 10/04/24 1729   Resp 14 10/04/24 1705   SpO2 100 % 10/04/24 1729   Vitals shown include unfiled device data.        Post Anesthesia Care and Evaluation    Level of consciousness: awake and alert  Pain management: adequate  Anesthetic complications: No anesthetic complications    Cardiovascular status: acceptable  Respiratory status: acceptable  Hydration status: acceptable    Comments: /93   Pulse 60   Temp 36.4 °C (97.5 °F) (Oral)   Resp 14   SpO2 99%

## 2024-10-04 NOTE — ANESTHESIA PREPROCEDURE EVALUATION
Anesthesia Evaluation     Patient summary reviewed and Nursing notes reviewed   NPO Solid Status: > 6 hours  NPO Liquid Status: > 2 hours           Airway   Mallampati: II  TM distance: >3 FB  Neck ROM: full  Dental - normal exam     Pulmonary    (+) a smoker Current,  (-) COPD, asthma  Cardiovascular   Exercise tolerance: good (4-7 METS)    (-) past MI, dysrhythmias, angina      Neuro/Psych  (-) seizures, CVA  GI/Hepatic/Renal/Endo    (-) GERD, liver disease, no renal disease, diabetes, no thyroid disorder    Musculoskeletal     Abdominal    Substance History      OB/GYN          Other                    Anesthesia Plan    ASA 2     general       Anesthetic plan, risks, benefits, and alternatives have been provided, discussed and informed consent has been obtained with: patient.    CODE STATUS:

## 2024-10-04 NOTE — PROGRESS NOTES
I have reviewed the notes, assessments, and/or procedures performed by Kilo Torres, I concur with her/his documentation of Anu Mendez.

## 2024-10-04 NOTE — ANESTHESIA PROCEDURE NOTES
Airway  Urgency: elective    Date/Time: 10/4/2024 4:11 PM  Airway not difficult    General Information and Staff    Patient location during procedure: OR    Indications and Patient Condition  Indications for airway management: airway protection    Preoxygenated: yes  Mask difficulty assessment: 1 - vent by mask    Final Airway Details  Final airway type: endotracheal airway      Successful airway: ETT  Cuffed: yes   Successful intubation technique: direct laryngoscopy  Endotracheal tube insertion site: oral  Blade: Stanley  Blade size: 4  ETT size (mm): 7.5  Cormack-Lehane Classification: grade I - full view of glottis  Placement verified by: chest auscultation and capnometry   Measured from: lips  ETT/EBT  to lips (cm): 22  Number of attempts at approach: 1  Assessment: lips, teeth, and gum same as pre-op and atraumatic intubation

## 2024-10-04 NOTE — DISCHARGE INSTRUCTIONS
Op?a anestezija, odrasli, njega poslije  Sljede?e informacije nude smjernice o tome kako se brinuti o sebi nakon zahvata. [facility]š [facility] dati i konkretnija uputstva. Ako imate problema hayde pitanja, obratite se svom ljekaru.  Šta mogu o?ekivati nakon procedure?  Nakon procedure, uobi?amy hinton da ljudi:  ? Imate stanton hayde nelagodu na IV mestu.  ? Imate mu?ninu hayde povra?anje.  ? Imate upalu grla hayde promuklost.  ? Imate problema sa koncentracijom.  ? Osjetite hladno?u hayde jezu.  ? Ose?ate slabost, pospanost hayde umor (umor).  ? Imate stanton i bolove u tijelu. Miles mogu utjecati na dijelove tijela gordy nisu bili uklju?atul u operaciju.  Slijedite ove upute kod ku?e:  Za rafitaemenski period gordy [facility]š [facility] re[facility]o:    ? Odmor.  ? Nemojte u?estvovati u aktivnostima u kojima biste mogli pasti hayde se povrijediti.  ? Nemojte voziti hayde upravljati mašinama.  ? Nemojte piti alkohol.  ? Nemojte uzimati tablete za spavanje hayde lijekove gordy izazivaju pospanost.  ? Nemojte donositi važne odluke bryan potpisivati pravne dokumente.  ? Nemojte dong brinuti o djeci.  Op?a uputstva  ? Pijte dovoljno te?nosti da vaš urin ostane bledožut.  ? Ako imate apneju za vrijeme spavanja, operacija i odre?taul lijekovi mogu pove?ati rizik od problema s disanjem. Slijedite upute vašeg [facility] o nošenju ure?mayi za spavanje:  ? Kad god spavate, uklju?edwin?i i dnevno spavanje.  ? Dok uzimate lijekove protiv bolova na recept, lijekove za spavanje hayde lijekove gordy vas ?ine pospanim.  ? Vratite se svojim normalnim aktivnostima kako vam je rekao vaš [facility]. Pitajte svog [facility] koje wilson aktivnosti bezbedne za vas.  ? Uzimajte lijekove bez recepta i lijekove na recept samo arielle up[facility]ama [facility].  ? Nemojte koristiti proizvode gordy sadrže nikotin hayde jazmine. Dano proizvodi uklju?edwin cigarete, duhan za žvakanje i ure?aje za vaping, petrona što wilson e-cigarete. Miles mogu odgoditi zarastanje misael nakon operacije. Ako vam mary jane potrebna pomo? hue prestanku pušenja,  pitajte svog ljekara.  Obratite se zdravstvenoj ustanovi ako:  ? Imate mu?ninu hayde povra?anje gordy se ne povla?e uz lijekove.  ? Povra?ate svaki put kada jedete hayde pijete.  ? Imate stanton gordy se ne popravlja lijekovima.  ? Ne možete mokriti hayde imate krvavu mokra?u.  ? Pojavite se osip na koži.  ? Imate temperaturu.  Odmah potražite pomo? ako:  ? Imate problema s disanjem.  ? Imate stanton u grudima.  ? Povra?ate krv.  Dano simptomi mogu biti hitan slu?aj. Odmah potražite pomo?. Pozovite 911.  ? Nemojte ?ekati da vidite da li ?e simptomi nestati.  ? Ne vozite dong do bolnice.  Rezime  ? Nakon zahvata uobi?ajeno je da imate upalu grla, promuklost, mu?ninu, povra?anje hayde osje?aj slabosti, pospanosti hayde umora.  ? Tokom perioda gordy vam je rekao vaš zdravstveni radnik, nemojte voziti bryan rukovati mašinama.  ? Odmah potražite pomo? ako imate poteško?a s disanjem, bolove u grudima hayde povra?ate krv. Dano simptomi mogu biti hitan slu?aj.  Ove informacije nemaju za cilj da zamijene savjete koje vam je lavell vaš ljekar. Obavezno razgovarajte o svim pitanjima koja imate sa svojim zdravstvenim radnicima.  Dokument revidiran: 17.03.2023. Dokument pregledan: 17.03.2023.  Elsenikki moreno © 2024 [employer]    dr Solitario Wilhelm  [address]  [place] [id number]  [phone]    Upute za otpust za apendektomiju louieila    1. Idi ku?i, odmori se i opusti se danas; me?utim, danas biste trebali ustati i krenuti nekoliko puta kako biste smanjili rizik od razvoja ugruška u nogama.  nedefinisano  2. Možda ?ete osjetiti vrtoglavicu hayde gubitak chris?enja od anestezije. Ovo može trajati sljede?a 24 sata. Oh bi trebao planirati ostati s vama prva 24 sata radi vaše sigurnosti.  nedefinisano  3. Nemojte donositi nikakve važne pravne odluke bryan potpisivati bilo kakve pravne papire u naredna 24 sata.  nedefinisano  4. Jedite i pijte lagano danas. U po?etku po?nite s teku?inom, margeom, teresa, naga hayde drugom  bljutavom hranom. Sutra možete unaprijediti svoju ishranu sve dok ne osjetite mu?ninu hayde povra?anje.  nedefinisano  5. Ako ruben koristili ljepilo za kožu ([place]), vaši rezovi wilson zašti?atul i prekriveni. Nevidljivi ljepilo ?e se melissa otopiti kako vaš florian zacijeli. Ako wilson korišteni zavoji, možete ukloniti vanjske obloge za 3 johnny. Bijele trake koje se nazivaju sterri-trake trebale bi ostati na mjestu. One ?e same otpasti za 1-2 sedmice. Ne brinite ako nestanu ranije.  nedefinisano  6. Ako wilson korišteni zavoji, možda ?ete primijetiti krvarenje/drenažu na vašim nickikrystyna mathew. Angelica krvava drenaža je normalna. Ako je krvarenje/drenaža takvo da ga zavoj ne može apsorbirati, skinite zavoj, stavite ?istu gazu i ?vrsto pritiskajte punih 15 minuta. Ako se krvarenje nastavi, pozovite me.  nedefinisano  7. Sutra se možete istuširati i dozvoliti da voda te?e preko rezova; me?utim, nemojte ribati svoje rezove. Nema kupanja u jorje dok vam rezovi potpuno ne zacijele.  nedefinisano  8. Dobili ruben recept za narkoti?ki lijek protiv bolova, darrian ?ete imati neke bolove nakon operacije. Ne?ete biti potpuno bezbolni, ali vaš lijek protiv bolova bi trebao u?initi stanton podnošljivim. Uzmite lijek protiv bolova kako vam je propisano i uvijek uzimajte tablete s hranom kako biste sprije?hayde mu?ninu. Ako imate jv bolove gordy se ne mogu kontrolisati lijekom protiv bolova, kontaktirajte me.  nedefinisano  9. Dobili ruben i recept za lijek protiv mu?nine. Uzmite ovo kako je propisano za bilo kakvu mu?ninu hayde povra?anje. Mu?teddy može biti posljedica anestezije hayde narkoti?kog lijeka protiv bolova. Ako osjetite jaku mu?ninu i povra?anje koje ne možete kontrolirati lijekom protiv mu?nine, nazovite me.  nedefinisano  10. Nije neobi?no da doživite stanton/neugodnost u ramenima hayde ispod jr mendez operacije. Nastaje od gasa gordy se korrichardi tokom laparoskopske procedure i obi?no traje 1-3 johnny. Lijek protiv lorraine kaminski se izdaje na recept koristi se za  lije?enje hirnaike boli i obi?no ne ublažava ovu „gasnu“ stanton.  nedefinisano  11. Zabranjeno je voziti 24 sata i sve dok uzimate lijek protiv bolova na recept.  nedefinisano  12. Morat ?ete nafede powers na [phone] da zakažete naknadni pregled za 6-10 johnny.  nedefinisano  13. Ne zaboravite da me kontaktirate za bilo šta od sljede?eg:     Groznica > 101 stepen   Diaz stanton gordy se ne može kontrolisati uzimanjem tableta protiv bolova   Teška mu?teddy hayde povra?anje koje se ne može kontrolisati uzimanjem tableta protiv mu?nine   Zna?ajno krvarenje vaših rezova   Drenaža koja nyasia loš miris hayde je žuta hayde zelena po izgledu   Bilo koja druga pitanja hayde nedoumice

## 2024-10-08 LAB
CYTO UR: NORMAL
LAB AP CASE REPORT: NORMAL
LAB AP INTRADEPARTMENTAL CONSULT: NORMAL
PATH REPORT.FINAL DX SPEC: NORMAL
PATH REPORT.GROSS SPEC: NORMAL

## 2024-10-10 ENCOUNTER — OFFICE VISIT (OUTPATIENT)
Dept: SURGERY | Facility: CLINIC | Age: 34
End: 2024-10-10
Payer: COMMERCIAL

## 2024-10-10 VITALS
WEIGHT: 204.6 LBS | DIASTOLIC BLOOD PRESSURE: 82 MMHG | BODY MASS INDEX: 24.91 KG/M2 | HEIGHT: 76 IN | SYSTOLIC BLOOD PRESSURE: 120 MMHG

## 2024-10-10 DIAGNOSIS — Z48.89 POSTOPERATIVE VISIT: Primary | ICD-10-CM

## 2024-10-10 PROCEDURE — 99024 POSTOP FOLLOW-UP VISIT: CPT | Performed by: SURGERY

## 2024-10-11 NOTE — PROGRESS NOTES
Postoperative visit    Laparoscopic appendectomy 10/4/2024    Pathology: Acute necrotizing appendicitis    Office visit: Incisions are healing well and he is doing well, reporting no problems from the surgery.  No further activity restrictions.  Follow-up as needed.

## (undated) DEVICE — ENDOPATH XCEL BLADELESS TROCARS WITH STABILITY SLEEVES: Brand: ENDOPATH XCEL

## (undated) DEVICE — SUT VIC 0 TN 27IN DYED JTN0G

## (undated) DEVICE — ADHS SKIN SURG TISS VISC PREMIERPRO EXOFIN HI/VISC FAST/DRY

## (undated) DEVICE — 2, DISPOSABLE SUCTION/IRRIGATOR WITH DISPOSABLE TIP: Brand: STRYKEFLOW

## (undated) DEVICE — ENDOPATH XCEL UNIVERSAL TROCAR STABLILITY SLEEVES: Brand: ENDOPATH XCEL

## (undated) DEVICE — SUT VIC 5/0 PS2 18IN J495H

## (undated) DEVICE — GLV SURG PREMIERPRO ORTHO LTX PF SZ7.5 BRN

## (undated) DEVICE — ENDOPOUCH RETRIEVER SPECIMEN RETRIEVAL BAGS: Brand: ENDOPOUCH RETRIEVER

## (undated) DEVICE — PATIENT RETURN ELECTRODE, SINGLE-USE, CONTACT QUALITY MONITORING, ADULT, WITH 9FT CORD, FOR PATIENTS WEIGING OVER 33LBS. (15KG): Brand: MEGADYNE

## (undated) DEVICE — OSC GEN LAPAROSCOPY: Brand: MEDLINE INDUSTRIES, INC.

## (undated) DEVICE — ENDOPATH PNEUMONEEDLE INSUFFLATION NEEDLES WITH LUER LOCK CONNECTORS 120MM: Brand: ENDOPATH

## (undated) DEVICE — ENDOCUT SCISSOR TIP, DISPOSABLE: Brand: RENEW

## (undated) DEVICE — SKIN PREP TRAY W/CHG: Brand: MEDLINE INDUSTRIES, INC.